# Patient Record
Sex: MALE | Race: BLACK OR AFRICAN AMERICAN | Employment: OTHER | URBAN - METROPOLITAN AREA
[De-identification: names, ages, dates, MRNs, and addresses within clinical notes are randomized per-mention and may not be internally consistent; named-entity substitution may affect disease eponyms.]

---

## 2019-07-22 ENCOUNTER — HOSPITAL ENCOUNTER (OUTPATIENT)
Dept: LAB | Age: 64
Discharge: HOME | End: 2019-07-22
Payer: MEDICARE

## 2019-07-22 ENCOUNTER — OFFICE VISIT (OUTPATIENT)
Dept: HEMATOLOGY | Age: 64
End: 2019-07-22

## 2019-07-22 VITALS
SYSTOLIC BLOOD PRESSURE: 184 MMHG | HEIGHT: 70 IN | TEMPERATURE: 96.5 F | BODY MASS INDEX: 23.19 KG/M2 | HEART RATE: 52 BPM | DIASTOLIC BLOOD PRESSURE: 97 MMHG | OXYGEN SATURATION: 98 % | WEIGHT: 162 LBS

## 2019-07-22 DIAGNOSIS — Z11.59 ENCOUNTER FOR SCREENING FOR OTHER VIRAL DISEASES: ICD-10-CM

## 2019-07-22 DIAGNOSIS — B18.2 CHRONIC HEPATITIS C WITHOUT HEPATIC COMA (HCC): Primary | ICD-10-CM

## 2019-07-22 PROBLEM — I42.9 CARDIOMYOPATHY (HCC): Status: ACTIVE | Noted: 2019-07-22

## 2019-07-22 PROBLEM — J43.9 EMPHYSEMA LUNG (HCC): Status: ACTIVE | Noted: 2019-07-22

## 2019-07-22 PROBLEM — F14.90 COCAINE USE: Status: ACTIVE | Noted: 2019-07-22

## 2019-07-22 LAB
ALBUMIN SERPL-MCNC: 4 G/DL (ref 3.4–5)
ALBUMIN/GLOB SERPL: 0.9 {RATIO} (ref 0.8–1.7)
ALP SERPL-CCNC: 80 U/L (ref 45–117)
ALT SERPL-CCNC: 51 U/L (ref 16–61)
ANION GAP SERPL CALC-SCNC: 2 MMOL/L (ref 3–18)
AST SERPL-CCNC: 54 U/L (ref 10–38)
BASOPHILS # BLD: 0 K/UL (ref 0–0.1)
BASOPHILS NFR BLD: 1 % (ref 0–2)
BILIRUB DIRECT SERPL-MCNC: 0.2 MG/DL (ref 0–0.2)
BILIRUB SERPL-MCNC: 0.6 MG/DL (ref 0.2–1)
BUN SERPL-MCNC: 12 MG/DL (ref 7–18)
BUN/CREAT SERPL: 11 (ref 12–20)
CALCIUM SERPL-MCNC: 9.6 MG/DL (ref 8.5–10.1)
CHLORIDE SERPL-SCNC: 105 MMOL/L (ref 100–111)
CO2 SERPL-SCNC: 33 MMOL/L (ref 21–32)
CREAT SERPL-MCNC: 1.13 MG/DL (ref 0.6–1.3)
DIFFERENTIAL METHOD BLD: ABNORMAL
EOSINOPHIL # BLD: 0.1 K/UL (ref 0–0.4)
EOSINOPHIL NFR BLD: 2 % (ref 0–5)
ERYTHROCYTE [DISTWIDTH] IN BLOOD BY AUTOMATED COUNT: 13 % (ref 11.6–14.5)
GLOBULIN SER CALC-MCNC: 4.7 G/DL (ref 2–4)
GLUCOSE SERPL-MCNC: 81 MG/DL (ref 74–99)
HCT VFR BLD AUTO: 44.3 % (ref 36–48)
HGB BLD-MCNC: 14.5 G/DL (ref 13–16)
INR PPP: 1 (ref 0.8–1.2)
LYMPHOCYTES # BLD: 1.4 K/UL (ref 0.9–3.6)
LYMPHOCYTES NFR BLD: 48 % (ref 21–52)
MCH RBC QN AUTO: 30.9 PG (ref 24–34)
MCHC RBC AUTO-ENTMCNC: 32.7 G/DL (ref 31–37)
MCV RBC AUTO: 94.3 FL (ref 74–97)
MONOCYTES # BLD: 0.3 K/UL (ref 0.05–1.2)
MONOCYTES NFR BLD: 11 % (ref 3–10)
NEUTS SEG # BLD: 1.1 K/UL (ref 1.8–8)
NEUTS SEG NFR BLD: 38 % (ref 40–73)
PLATELET # BLD AUTO: 144 K/UL (ref 135–420)
PMV BLD AUTO: 13 FL (ref 9.2–11.8)
POTASSIUM SERPL-SCNC: 4.4 MMOL/L (ref 3.5–5.5)
PROT SERPL-MCNC: 8.7 G/DL (ref 6.4–8.2)
PROTHROMBIN TIME: 12.7 SEC (ref 11.5–15.2)
RBC # BLD AUTO: 4.7 M/UL (ref 4.7–5.5)
SODIUM SERPL-SCNC: 140 MMOL/L (ref 136–145)
WBC # BLD AUTO: 3 K/UL (ref 4.6–13.2)

## 2019-07-22 PROCEDURE — 85610 PROTHROMBIN TIME: CPT

## 2019-07-22 PROCEDURE — 87902 NFCT AGT GNTYP ALYS HEP C: CPT

## 2019-07-22 PROCEDURE — 85025 COMPLETE CBC W/AUTO DIFF WBC: CPT

## 2019-07-22 PROCEDURE — 86706 HEP B SURFACE ANTIBODY: CPT

## 2019-07-22 PROCEDURE — 36415 COLL VENOUS BLD VENIPUNCTURE: CPT

## 2019-07-22 PROCEDURE — 86704 HEP B CORE ANTIBODY TOTAL: CPT

## 2019-07-22 PROCEDURE — 80076 HEPATIC FUNCTION PANEL: CPT

## 2019-07-22 PROCEDURE — 86708 HEPATITIS A ANTIBODY: CPT

## 2019-07-22 PROCEDURE — 80048 BASIC METABOLIC PNL TOTAL CA: CPT

## 2019-07-22 PROCEDURE — 87340 HEPATITIS B SURFACE AG IA: CPT

## 2019-07-22 PROCEDURE — 87521 HEPATITIS C PROBE&RVRS TRNSC: CPT

## 2019-07-22 RX ORDER — ATORVASTATIN CALCIUM 10 MG/1
10 TABLET, FILM COATED ORAL
COMMUNITY
Start: 2019-07-06 | End: 2019-08-05

## 2019-07-22 RX ORDER — ASPIRIN 81 MG/1
81 TABLET ORAL
COMMUNITY
Start: 2019-07-06 | End: 2020-01-21

## 2019-07-22 NOTE — Clinical Note
8/25/19 Patient: Hortencia Laird  
YOB: 1955 Date of Visit: 7/22/2019 Reed Dietz DO 
[address] [address] VIA Facsimile: [phone] Dear Reed Dietz DO, Thank you for referring Mr. Hortencia Laird to [address] for evaluation. My notes for this consultation are attached. If you have questions, please do not hesitate to call me. I look forward to following your patient along with you. Sincerely, Mariano Jung MD

## 2019-07-22 NOTE — PROGRESS NOTES
3340 hospitalsMD Cruz Ely, MD Luetta Griffin, PA-C Ramonia Churn, [facility]     April S Yu, [facility]   HARSHAD HughesP-JENNIFER Drew [facility]       Malcolm [address]allanes[address]    at [address], [address] [place] [address], [facility] 22.    [phone]    FAX: [address] [place] [address], [address]    [phone]    FAX: [phone]       Patient Care Team:  Liza Banks DO as PCP - General (Family Practice)      Problem List  Date Reviewed: 1/21/2019          Codes Class Noted    Chronic hepatitis C without hepatic coma ([facility] ICD-10-CM: B18.2  ICD-9-CM: 070.54  7/22/2019        Cardiomyopathy ([facility] ICD-10-CM: I42.9  ICD-9-CM: 425.4  7/22/2019        Emphysema lung ([facility] ICD-10-CM: J43.9  ICD-9-CM: 492.8  7/22/2019        Cocaine use ICD-10-CM: F14.90  ICD-9-CM: 305.60  7/22/2019              The clinicians listed above have asked me to see Rylan Harvey in consultation regarding chronic HCV and its management. All medical records sent by the referring physicians were reviewed     The patient is a 75year old male who was screened for anti-HCV and tested positive in 2015/2016. Risk factors for acquiring HCV are IV drug use, inhaling cocaine     There was no history of an episode of acute icteric hepatitis at the time of these risk factors. Imaging of the liver was either not performed or the results are not available to me. An assessment of liver fibrosis with biopsy or elastography has not been performed. The patient has never received treatment for chronic HCV. The patient notes fatigue.     The patient has not experienced the following symptoms:  pain in the right side over the liver, yellowing of the eyes or skin, problems concentrating, swelling of the abdomen, swelling of the lower extremities, hematemesis, hematochezia. The patient has moderate limitations in functional activities which can be attributed to other medical problems that are not related to the liver disease. ASSESSMENT AND PLAN:  Chronic HCV   Chronic HCV of unclear severity. Have performed laboratory testing to monitor liver function and degree of liver injury. This included BMP, hepatic panel, CBC with platelet count,   AST is elevated. ALT is normal.  ALP is normal.  Liver function is normal.  The platelet count is normal.      Will perform and/or review results of HCV viral load, HCV genotype to define the specific treatment and duration of treatment that will be required. Will perform serologic and virologic studies to assess for other causes of chronic liver disease. Will perform imaging of the liver with ultrasound. The need to assess liver fibrosis was discussed. Sheer wave elastography can assess liver fibrosis and determine if a patient has advanced fibrosis or cirrhosis without the need for liver biopsy. This will be scheduled. Chronic HCV Treatment  The patient has not been treated for HCV. The patient has HCV genotype 1A. The patient should be treated with Harvoni (sofasbuvir and ledipasvir), Mavyret (glecaprevir and piprentasvir) or Epclusa (sofosbuvir and valpitasvir)   The SVR/cure rate for is over 95%. Screening for Hepatocellular Carcinoma  HCC screening is not necessary if the patient has no evidence of cirrhosis. Treatment of other medical problems in patients with chronic liver disease  There are no contraindications for the patient to take most medications that are necessary for treatment of other medical issues.     Counseling for alcohol in patients with chronic liver disease  The patient was counseled regarding alcohol consumption and the effect of alcohol on chronic liver disease. The patient does not consume any significant amount of alcohol. Drug use  The patient was counseled regarding the risk of overdose and death from using narcotic drugs and the risk of becoming reinfected with HCV once they are cured if they resume narcotic drug use. Vaccinations   Vaccination for viral hepatitis B is recommended since the patient has no serologic evidence of previous exposure or vaccination with immunity. Vaccination for viral hepatitis A is not needed. The patient has serologic evidence of prior exposure or vaccination with immunity. Routine vaccinations against other bacterial and viral agents can be performed as indicated. Annual flu vaccination should be administered if indicated. ALLERGIES  Not on File    MEDICATIONS  Current Outpatient Medications   Medication Sig    aspirin delayed-release 81 mg tablet Take 81 mg by mouth.  atorvastatin (LIPITOR) 10 mg tablet Take 10 mg by mouth. No current facility-administered medications for this visit. SYSTEM REVIEW NOT RELATED TO LIVER DISEASE OR REVIEWED ABOVE:  Constitution systems: Negative for fever, chills, weight gain, weight loss. Eyes: Negative for visual changes. ENT: Negative for sore throat, painful swallowing. Respiratory: Negative for cough, hemoptysis, SOB. Cardiology: Negative for chest pain, palpitations. GI:  Negative for constipation or diarrhea. : Negative for urinary frequency, dysuria, hematuria, nocturia. Skin: Negative for rash. Hematology: Negative for easy bruising, blood clots. Musculo-skelatal: Negative for back pain, muscle pain, weakness. Neurologic: Negative for headaches, dizziness, vertigo, memory problems not related to HE. Psychology: Negative for anxiety, depression. FAMILY HISTORY:  The patient has no knowledge of the father's medical condition. Father is . The mother is .    There is no family history of liver disease. SOCIAL HISTORY:  The patient is single. The patient has 2 children. The patient currently smokes 10 cigarettes daily. The patient  consumes alcohol on social occasions never in excess. The patient has previously consumed alcohol socially never in excess. The patient does not work. The patient is currently receiving disability. PHYSICAL EXAMINATION:  Visit Vitals  BP (!) 184/97   Pulse (!) 52   Temp 96.5 °F (35.8 °C) (Tympanic)   Ht 5' 9.5\" (1.765 m)   Wt 162 lb (73.5 kg)   SpO2 98%   BMI 23.58 kg/m²     General: No acute distress. Eyes: Sclera anicteric. ENT: No oral lesions. Thyroid normal.  Nodes: No adenopathy. Skin: No spider angiomata. No jaundice. No palmar erythema. Respiratory: Lungs clear to auscultation. Cardiovascular: Regular heart rate. No murmurs. No JVD. Abdomen: Soft non-tender. Liver size normal to percussion/palpation. Spleen not palpable. No obvious ascites. Extremities: No edema. No muscle wasting. No gross arthritic changes. Neurologic: Alert and oriented. Cranial nerves grossly intact. No asterixis.     LABORATORY STUDIES:  Liver [place] of [address] Units 7/22/2019   WBC 4.6 - 13.2 K/uL 3.0 (L)   ANC 1.8 - 8.0 K/UL 1.1 (L)   HGB 13.0 - 16.0 g/dL 14.5    - 420 K/uL 144   INR 0.8 - 1.2   1.0   AST 10 - 38 U/L 54 (H)   ALT 16 - 61 U/L 51   Alk Phos 45 - 117 U/L 80   Bili, Total 0.2 - 1.0 MG/DL 0.6   Bili, Direct 0.0 - 0.2 MG/DL 0.2   Albumin 3.4 - 5.0 g/dL 4.0   BUN 7.0 - 18 MG/DL 12   Creat 0.6 - 1.3 MG/DL 1.13   Na 136 - 145 mmol/L 140   K 3.5 - 5.5 mmol/L 4.4   Cl 100 - 111 mmol/L 105   CO2 21 - 32 mmol/L 33 (H)   Glucose 74 - 99 mg/dL 81     SEROLOGIES:  Serologies Latest Ref Rng & Units 7/22/2019   Hep A Ab, Total NEGATIVE   Positive (A)   Hep B Surface Ag <1.00 Index <0.10   Hep B Surface Ag Interp NEG   NEGATIVE   Hep B Core Ab, Total NEGATIVE   NEGATIVE   Hep B Surface Ab >10.0 mIU/mL 3.38 (L)   Hep B Surface Ab Interp POS   NEGATIVE (A)   Hep C Genotype  1a     LIVER HISTOLOGY:  Not available or performed    ENDOSCOPIC PROCEDURES:  Not available or performed    RADIOLOGY:  Not available or performed    OTHER TESTING:  Not available or performed    FOLLOW-UP:  All of the issues listed above in the Assessment and Plan were discussed with the patient. All questions were answered. The patient expressed a clear understanding of the above. [address] [place] [address] in 4 weeks for routine monitoring and to review all data and determine the treatment plan. MD Ronnell Ferreira [address] [place] [address] 22. [phone]  Tate Pierce MD  I have personally interviewed and examined the patient during the encounter. I have explained the key findings related to the liver disease and other pertinent diagnoses as noted above to the patient and answered all questions. I have reviewed and agree with the findings documented above, including all diagnostic interpretations, and plans. I have edited the original note as appropriate for clarity.     MD Ronnell Louise [address] [place] [address] 22.  [phone]  [address] [place] [address]

## 2019-07-23 LAB
HAV AB SER QL IA: POSITIVE
HBV CORE AB SERPL QL IA: NEGATIVE
HBV SURFACE AB SER QL IA: NEGATIVE
HBV SURFACE AB SERPL IA-ACNC: 3.38 MIU/ML
HBV SURFACE AG SER QL: <0.1 INDEX
HBV SURFACE AG SER QL: NEGATIVE
HEP BS AB COMMENT,HBSAC: ABNORMAL

## 2019-07-26 LAB
HCV RNA SERPL NAA+PROBE-LOG IU: 4.94 HCV LOG 10IU/ML
HCV RNA SERPL PROBE AMP-ACNC: ABNORMAL HCVIU/ML
HCV RNA SERPL QL NAA+PROBE: POSITIVE

## 2019-07-28 LAB
HCV GENTYP SERPL NAA+PROBE: NORMAL
PLEASE NOTE, 550474: NORMAL

## 2019-08-15 DIAGNOSIS — B18.2 CHRONIC HEPATITIS C WITHOUT HEPATIC COMA (HCC): Primary | ICD-10-CM

## 2019-09-25 ENCOUNTER — HOSPITAL ENCOUNTER (OUTPATIENT)
Dept: GENERAL RADIOLOGY | Age: 64
Discharge: HOME | End: 2019-09-25
Attending: FAMILY MEDICINE
Payer: MEDICARE

## 2019-09-25 DIAGNOSIS — R13.10 DYSPHAGIA: ICD-10-CM

## 2019-09-25 PROCEDURE — 74220 X-RAY XM ESOPHAGUS 1CNTRST: CPT

## 2019-09-25 PROCEDURE — 74011000255 HC RX REV CODE- 255

## 2019-09-25 PROCEDURE — 74011000250 HC RX REV CODE- 250

## 2019-09-25 RX ADMIN — ANTACID/ANTIFLATULENT 4 G: 380; 550; 10; 10 GRANULE, EFFERVESCENT ORAL at 09:19

## 2019-09-25 RX ADMIN — BARIUM SULFATE 135 ML: 980 POWDER, FOR SUSPENSION ORAL at 09:18

## 2019-09-25 RX ADMIN — BARIUM SULFATE 176 G: 960 POWDER, FOR SUSPENSION ORAL at 09:18

## 2019-10-08 ENCOUNTER — HOSPITAL ENCOUNTER (OUTPATIENT)
Dept: PHYSICAL THERAPY | Age: 64
Discharge: HOME | End: 2019-10-08
Payer: MEDICARE

## 2019-10-08 PROCEDURE — 92610 EVALUATE SWALLOWING FUNCTION: CPT

## 2019-10-08 PROCEDURE — 92526 ORAL FUNCTION THERAPY: CPT

## 2019-10-08 NOTE — PROGRESS NOTES
[facility]  [address] [facility] [facility] [address] Serenity Miranda, [address] [address] [facility]  Ph [phone]  Fx [phone]      Plan of Care/ Statement of Necessity for Speech Therapy Services    Patient name: Carmen Brennan Start of Care: 10/8/2019   Referral source: London Timmons DO : 1955    Medical Diagnosis: Dysphagia [R13.10]   Onset Date:    Treatment Diagnosis: Dysphagia [R13.10]   Prior Hospitalization: see medical history Provider#: [id number]   Medications: Verified on Patient summary List    Comorbidities: Arthritis, HTN, Wears glasses, CVA, Hepatitis A, Former drug use, Tobacco use   Prior Level of Function: Initially, swallow difficulty began in early  following cessation of illicit drug use. Difficulty with swallowing worsened following left CVA suffered 3 months ago. The Plan of Care and following information is based on the information from the initial evaluation. Assessment/ key information:   Pt is a 59-year-old male who was referred for an outpatient Dysphagia evaluation due to difficulty swallowing. Initially, swallow difficulty began in early  following cessation of illicit drug use. Difficulty with swallowing worsened following left CVA suffered 3 months ago. He reported globus sensation with liquids and solids and excessive coughing during meal times. Additionally, Pt takes medication without liquid, as he shared taking with liquid leads to coughing spells. Recent barium esophagram performed on 2019 revealed silent aspiration and diffuse diminished esophageal peristalsis with tertiary contractions. Clinical Swallow Examination performed this date. Pt passed an oral mechanism/cranial nerve examination for CN V, IX, X, XI, XII. Mild right facial weakness and nasolabial fold asymmetry. Do anterior loss observed during CSE and none reported by Pt. Speech intelligibility was within functional limits, although mild articulatory imprecision noted during connected speech.  No evidence of Aphasia or Apraxia. Pt is partially edentulous and does not wear dentures. Poor dental hygiene noted and small, brown spots observed on soft/hard palate. Pt shared that he will be visiting a dentist soon. Uvula was symmetrical at rest. Tongue size/shape was [facility] with no evidence of abnormal fasciculations present. Slightly reduced facial tone on lower right quadrant and decreased nasolabial fold symmetry. Otherwise, examination of facial musculature was [facility]. Pt administered the EAT-10. 40% of scores were greater than 2, indicating difficulty swallowing efficiently and safely. Pt received a passing score on the [facility] Protocol, as there were no overt s/sx of aspiration noted during consecutive sips of water, although globus sensation was noted with thin liquid, as well as multiple swallows post initial swallow of bolus. It is recommended that Pt participate in a modified barium swallow study to full assess structure and physiology of the swallowing mechanism to determine best course of action. Pt educated on compensatory strategy to utilize in the home to better manage symptoms until date of MBS (alternate liquids and solids, slow rate of consumption). Problem List:      []aphasic  []dysarthric  [x]dysphagic       []alexic  []agraphic  []dysphonia       []dysfluency   []Cognitive-Linguistic Disorder       []other   Treatment Plan may include any combination of the following: Dysphagia Treatment and Patient Education      Patient / Family readiness to learn indicated by: asking questions and interest    Persons(s) to be included in education:   patient (P)    Barriers to Learning/Limitations: None  Measures Taken If Barriers to Learning Indicated: N/A    Patient Goal (s): I want to correct whatever's wrong.     Patient Self Reported Health Status: fair    Rehabilitation Potential: good    Short Term Goals:  To be accomplished in 8 weeks  1) Pt will participate in a modified barium swallow study to formally assess structure and physiology of swallow function to determine best treatment plan and diet recommendations. *Goals to be updated upon completion of test    Long Term Goals: To be accomplished in 10 weeks   1) Pt will utilize compensatory strategies with optimum safety and efficiency of swallowing function on P.O. intake without overt signs and symptoms of aspiration for the highest appropriate diet level. Frequency / Duration: Patient to be seen 1 times per week for 8 weeks:  Certification Period: 10/8/19 to 11/7/19   Patient/ Caregiver education and instruction: Diagnosis, prognosis, compensatory strategies, MBSS education    MANI Quinonez 10/8/2019 12:13 PM  ________________________________________________________________________    I certify that the above Therapy Services are being furnished while the patient is under my care. I agree with the treatment plan and certify that this therapy is necessary.     Physician's Signature:____________Date:_________TIME:________    [employer], Date and Time must be completed for valid certification **    Please sign and return to [facility]  [address], [address] [facility]  Ph [phone]  Fx [phone]       Thank you

## 2019-10-08 NOTE — PROGRESS NOTES
ST DAILY TREATMENT NOTE/ SWALLOW EVAL    Patient Name: Ramesh Silverio  Date:10/8/2019  : 1955  [x]  Patient  Verified  Payor: Duran Souaz / Plan: [address] [place] [address] HMO / Product Type: Managed Care Medicare /   In time:11;30  Out time:12;15  Total Treatment Time (min): 45  Visit #: 1 of 8    SUBJECTIVE  Pain Level (0-10 scale): 0  Any medication changes, allergies to medications, adverse drug reactions, diagnosis change, or new procedure performed?: [x] No    [] Yes (see summary sheet for update)  Subjective functional status/changes:   [] No changes reported  I want to correct whatever's wrong.   Date of Onset:   Social History: Not working; Likes to listen to music, lives with roommate   Prior Functional level: Initially, swallow difficulty began in early  following cessation of illicit drug use. Difficulty with swallowing worsened following left CVA suffered 3 months ago. Radiology: Recent barium esophagram performed on 2019 revealed silent aspiration and diffuse diminished esophageal peristalsis with tertiary contractions. Current diet: Regular with thin liquids  positionin degrees upright  Mental Status:  [x]alert []lethargic []confused  Orientation: [x]person [x]place [x]time [x]situation  AC Directions: []1-step []2-step []3-step [x]complex  Motivation: [x]excellent []good  []fair  []poor   Barriers to learning: [x]none []aphasia []? cognition []lethargy/motivation [][place]   []?vision []language []Fatigue/pain []psych factors compensate with:   Dentition: []normal []abnormal [x]edentulous []dentures   Respiratory Status: [x]WFL []SOB  []O2 L/min:  []NC []Mask               []Trach Tube:                     []Excess secretions  Lips:  []Symmetrical [x]asymmetrical  Retraction []WFL  [x]?min []?mod []?max  Protrusion [x]WFL  []?min []?mod []?max  Strength [x]WFL  []?min []?mod []?max  Puff  []WFL  []?min []?mod []?max  Tongue:  [x]Symmetrical []asymmetrical  Protrusion [x]WFL  []?min []?mod []?max  Elevation [x]WFL  []?min []?mod []?max  Depression [x]WFL  []?min []?mod []?max  Lateralization [x]WFL  []?min []?mod []?max  Strength [x]WFL  []?min []?mod []?max  Velum:  [x]Symmetrical []asymmetrical  Gag Reflex:  []Present []absent [x]DNT  Sensation:  [x]Intact []Diminished  Specify: CN test confirmed sensation WFL  Voice:  [x]Normal []Hoarse []harsh  []Breathy []Hypernasal []hyponasal  []Gurgly Other:   Swallow:  []Volitional []absent  [x]Reflexive []absent  Cough   Strength : [x]WNL []Diminished  [x]Volitional []absent  [x]Reflexive []absent  OBJECTIVE    OP SWALLOW EVALUATION    Observation of Swallow:  Thin Nectar Honey Puree  applesauce Solids  Mechanical soft fruit and grain bar/regular peanut butter cracker nabs Other  Mixed consistency canned fruit cocktail   Oral Phase         Anterior loss of bolus  (decreased labial seal [])         Decreased bolus formation  (?lingual ROM/Coord[])         Increased mastication         Increased oral transit time  (?A-P bolus transit[])         Abnormal chewing         Tongue pumping/tremors         Pocketing  (?labial/buccal tension/lingual control)         Other         Oral Pharyngeal Phase         Delayed swallow initiation         Absent swallow         Stasis on lingual surface  (?lingual movement)         Adherence to hard palate   (? lingual elevation)         Pharyngeal Phase         Multiple swallows  (residue in pharynx [x]) X        Coughing post swallow         Throat clear post swallow         Wet vocal quality  (residue on vocal cords [])         Reduced laryngeal elevation         Nasal Regurgitation  (? velopharyngeal closure)         Other           [] No symptoms of dysphagia evidenced  [] Symptoms of dysphagia observed  [] Patient at risk for aspiration  [x] Other: Instrumental assessment warranted    Recommendations:  Diet:  [] NPO    [] Pureed [] Ground [x] MechSoft [x] Regular  Liquids: [x] Water  [] Regular [] Thickened   [] Marty  [] Honeythick  [] Pudding  Presentation: [x] Cup    [] Spoon [] Straw [] Alternate liquids and solids  Monitor: [x] Sitting up at 90 deg [] Reclined to:  [] Head turned to:    [] Chin tuck  [] Head tilt to:      [] Seated upright post meals (min):  Document: [] Coughing [] Temperatures [] Lung Sounds    Videoflouroscopy: [x] Yes [] No  Dysphagia Treatment: [x] Yes [] No Sessions per week:1  Other:    Remediation Techniques:  C = Compensatory techniques to use during meal      F = Facilitation/treatment techniques by SLP    [] Supraglottic Swallow (c,f)    [] Oral motor exercises (f)  [] Super-supraglottic swallow (c,f)   [] Labial closure  [] Compensations for pocketing (c)   [] Lingual elevation  [] Sweep mouth with tongue    [] Lingual lateralization  [] Sweep mouth with finger    [] Lingual anterior-posterior  [] External pressure to check   [] Lingual base of tongue  [] Rinse mouth/expel after meal   [] Vocal Fold Exercises (f)  [x] Alternate liquid swallows every 1-2 bites (c)  [] Falsetto/laryngeal elevation exercises (f)  [] Discourage liquid wash between bites (c)  [] Thermal application (c,f)  [x] Multiple swallows     [] Sour bolus (f)  [] Patient needs cues     [] Cold bolus (f)  [] Patient does not need cues   [] Pharyngeal exercise (f)  [] Mendelsohn Maneuver (c,f)    [] Breath hold  [] Encourage/stimulate lip closure (c)   [] Effortful Swallow (c,f)  [] Empty mouth before next bite (c)   [] Tongue base retraction  [] Cue patient to slow down (c)    [] Tongue hold  [] Encourage coughing (c)    [] Laryngeal closure  []Chin tuck (c)  []Head turn  (c)  []Head turn , chin tuck (c)    Patient/Caregiver instruction/education: [x] Review HEP    [] Progressed/Changed    HEP/Handouts given: Implement compensatory strategies    Pain Level (0-10 scale) post treatment: 0    ASSESSMENT  [x]  See Plan of Care     PLAN  []  Upgrade activities as tolerated     [x]  Continue plan of care  []  Discharge due to:__  [] Other:__    MANI Gaytan 10/8/2019  12:37 PM

## 2019-10-09 ENCOUNTER — HOSPITAL ENCOUNTER (OUTPATIENT)
Dept: ULTRASOUND IMAGING | Age: 64
Discharge: HOME | End: 2019-10-09
Attending: INTERNAL MEDICINE
Payer: MEDICARE

## 2019-10-09 DIAGNOSIS — B18.2 CHRONIC HEPATITIS C WITHOUT HEPATIC COMA (HCC): ICD-10-CM

## 2019-10-09 PROCEDURE — 76981 USE PARENCHYMA: CPT

## 2019-10-24 ENCOUNTER — HOSPITAL ENCOUNTER (OUTPATIENT)
Dept: GENERAL RADIOLOGY | Age: 64
Discharge: ANOTHER HOSPITAL | End: 2019-10-24
Attending: FAMILY MEDICINE
Payer: MEDICARE

## 2019-10-24 ENCOUNTER — HOSPITAL ENCOUNTER (OUTPATIENT)
Dept: PHYSICAL THERAPY | Age: 64
Discharge: HOME | End: 2019-10-24
Payer: MEDICARE

## 2019-10-24 DIAGNOSIS — R13.10 PROBLEMS WITH SWALLOWING AND MASTICATION: ICD-10-CM

## 2019-10-24 PROCEDURE — 74011000255 HC RX REV CODE- 255

## 2019-10-24 PROCEDURE — 74230 X-RAY XM SWLNG FUNCJ C+: CPT

## 2019-10-24 PROCEDURE — 92611 MOTION FLUOROSCOPY/SWALLOW: CPT

## 2019-10-24 RX ADMIN — BARIUM SULFATE 35 G: 960 POWDER, FOR SUSPENSION ORAL at 09:27

## 2019-10-24 RX ADMIN — BARIUM SULFATE 700 MG: 700 TABLET ORAL at 09:27

## 2019-10-24 RX ADMIN — BARIUM SULFATE 10 ML: 400 PASTE ORAL at 09:27

## 2019-10-24 NOTE — PROGRESS NOTES
[facility] [place] [address]  Ph: [phone]    Fax: [phone]    Outpatient Modified Barium Swallow Evaluation    Patient: Dimitri Singh (87 y.o. male)  Date: 10/24/2019  Primary Diagnosis: No admission diagnoses are documented for this encounter. Precautions: Aspiration    Radiologist: Dossie Goodpasture    History: Swallow difficulty began in early 2019 following cessation of illicit drug use. Difficulty with swallowing worsened following left CVA suffered 3 months ago. Patient reports having many recent dental extractions, reports mouth is currently painful. Videofluoroscopy Results/Recommendations:  Modified Barium Swallow completed with 0 aspiration evident across all study trials, to include: thin liquid +/- straw; pudding; cracker; and 13 mm barium pill with thin liquid wash. Pt demo: significantly delayed mastication of solids due to limited dentition with premature spillage to the level of the vallcula. x1 occurrence of flash penetration with thin liquid via straw. (+) Swallow reflex; and (+) hyolaryngeal excursion. Pt presents with mild oral dysphagia due to poor dentition, as evidenced above. At this time, safe for mechanical soft solid, thin liquid diet. SLP utilized video of study for visual feedback, education regarding safe swallowing guidelines, and recommendations for patient; verbalized comprehension. Video Flouroscopic Procedures  [x] Lateral View   [] A-P View [] Scanned to level of Sternum    [x] Seated at 90 deg.   [] Other:    Presentation:    [x] Spoon   [x] Cup   [x] Straw   [] Syringe   [x] Consecutive Swallows  [] Other:    Consistencies:   [x] Ba+ liquid   [] Ba+ liquid (nectar)   [] Ba+ liquid (honey)   [x] Ba+ puree [x] Ba+ cookie [x] 13 mm Ba pill with thin Ba wash    Testing Discontinued:   [] Due to:    Treatment Techniques Attempted  [] Head Turn: [] Right [] Left     [] Head Tilt: [] Right [] Left     [] Chin Down:  [x] Small Sips/bites:  [] Effortful swallow:  [] Double swallow:  [] Other:    Results  Dysphagia Present:     [x] Yes  [] No    Ratings of Dysphagia:     [x] Mild  [] Moderate  [] Severe    Stages of Breakdown:   [x] Oral  [] Pharyngeal  [] Esophageal    Aspiration:   [] Yes    [x] No  [] At Risk     Cough: [] Yes      [] No     Penetration:   [x] Yes    [] No     Cough: [] Yes      [x] No   [x] Flash/trace   [] Mod   [] To Chords          Consistency Aspirated:   Consistency Penetrated:   [] Thin Liquid     [x] Thin Liquid   [] Nectar-thick Liquid    [] Nectar-thick Liquid   [] Honey-thick Liquid    [] Honey-thick Liquid   [] Puree     [] Puree  [] Solid     [] Solid  [] 13 mm Ba pill with thin   [] 13 mm Ba pill with thin     Motility Problems with:  [] Lip Closure:    [x] Mastication: delayed  [] Bolus Formation/control:   [] A-P Transport:  [] Tongue Base Retraction:  [] Swallow Response (delayed):  [] Velopharyngeal Closure:  [] Pharyngeal Aspirations:  [] Laryngeal Elevation/adduction:  [] Epiglottic Inversion:  [] Pharyngeal motility/sensation:  [] Cricopharyngeal Relaxation:  [] Esophageal Peristalsis:  [] Other:    Timing of Aspiration/Penetration:  [] Before Swallow:  [x] During Swallow:   [] After Swallow:    Transit Time Delay:  [x] >10 Second  Oral  [] >1 Second Pharyngeal  [] >20 Second Esophageal     Residuals:  [] Vallecula:    [] Mild  [] Mod  [] Severe  [] Pyriform Sinus:   [] Mild  [] Mod  [] Severe  [] Posterior Pharyngeal Wall:  [] Mild  [] Mod  [] Severe    The severity rating is based on the following outcomes:    National Outcomes Measures (NOMS) - CHRISTIAN Noms Swallow Level 5  8-point Penetration-Aspiration Scale - Score 2  Professional Judgement    Sindi Amor M.S., CCC-SLP  Speech-Language Pathologist    [facility]

## 2019-11-05 ENCOUNTER — OFFICE VISIT (OUTPATIENT)
Dept: HEMATOLOGY | Age: 64
End: 2019-11-05

## 2019-11-05 ENCOUNTER — HOSPITAL ENCOUNTER (OUTPATIENT)
Dept: LAB | Age: 64
Discharge: HOME | End: 2019-11-05
Payer: MEDICARE

## 2019-11-05 VITALS
HEIGHT: 70 IN | DIASTOLIC BLOOD PRESSURE: 115 MMHG | SYSTOLIC BLOOD PRESSURE: 203 MMHG | WEIGHT: 178 LBS | TEMPERATURE: 96.7 F | BODY MASS INDEX: 25.48 KG/M2 | HEART RATE: 62 BPM | OXYGEN SATURATION: 98 %

## 2019-11-05 DIAGNOSIS — B18.2 CHRONIC HEPATITIS C WITHOUT HEPATIC COMA (HCC): ICD-10-CM

## 2019-11-05 DIAGNOSIS — B18.2 CHRONIC HEPATITIS C WITHOUT HEPATIC COMA (HCC): Primary | ICD-10-CM

## 2019-11-05 PROBLEM — I10 HTN (HYPERTENSION): Status: ACTIVE | Noted: 2019-11-05

## 2019-11-05 PROBLEM — F32.A DEPRESSION: Status: ACTIVE | Noted: 2019-11-05

## 2019-11-05 PROBLEM — E78.00 HYPERCHOLESTEROLEMIA: Status: ACTIVE | Noted: 2019-11-05

## 2019-11-05 LAB
ALBUMIN SERPL-MCNC: 3.6 G/DL (ref 3.4–5)
ALBUMIN/GLOB SERPL: 0.8 {RATIO} (ref 0.8–1.7)
ALP SERPL-CCNC: 75 U/L (ref 45–117)
ALT SERPL-CCNC: 49 U/L (ref 16–61)
ANION GAP SERPL CALC-SCNC: 6 MMOL/L (ref 3–18)
AST SERPL-CCNC: 51 U/L (ref 10–38)
BASOPHILS # BLD: 0 K/UL (ref 0–0.1)
BASOPHILS NFR BLD: 0 % (ref 0–2)
BILIRUB DIRECT SERPL-MCNC: 0.2 MG/DL (ref 0–0.2)
BILIRUB SERPL-MCNC: 0.4 MG/DL (ref 0.2–1)
BUN SERPL-MCNC: 11 MG/DL (ref 7–18)
BUN/CREAT SERPL: 12 (ref 12–20)
CALCIUM SERPL-MCNC: 9 MG/DL (ref 8.5–10.1)
CHLORIDE SERPL-SCNC: 106 MMOL/L (ref 100–111)
CO2 SERPL-SCNC: 28 MMOL/L (ref 21–32)
CREAT SERPL-MCNC: 0.9 MG/DL (ref 0.6–1.3)
DIFFERENTIAL METHOD BLD: ABNORMAL
EOSINOPHIL # BLD: 0.1 K/UL (ref 0–0.4)
EOSINOPHIL NFR BLD: 3 % (ref 0–5)
ERYTHROCYTE [DISTWIDTH] IN BLOOD BY AUTOMATED COUNT: 13.5 % (ref 11.6–14.5)
GLOBULIN SER CALC-MCNC: 4.7 G/DL (ref 2–4)
GLUCOSE SERPL-MCNC: 82 MG/DL (ref 74–99)
HCT VFR BLD AUTO: 44.1 % (ref 36–48)
HGB BLD-MCNC: 14.3 G/DL (ref 13–16)
LYMPHOCYTES # BLD: 1.7 K/UL (ref 0.9–3.6)
LYMPHOCYTES NFR BLD: 49 % (ref 21–52)
MCH RBC QN AUTO: 31 PG (ref 24–34)
MCHC RBC AUTO-ENTMCNC: 32.4 G/DL (ref 31–37)
MCV RBC AUTO: 95.5 FL (ref 74–97)
MONOCYTES # BLD: 0.5 K/UL (ref 0.05–1.2)
MONOCYTES NFR BLD: 13 % (ref 3–10)
NEUTS SEG # BLD: 1.2 K/UL (ref 1.8–8)
NEUTS SEG NFR BLD: 35 % (ref 40–73)
PLATELET # BLD AUTO: 185 K/UL (ref 135–420)
PMV BLD AUTO: 12.5 FL (ref 9.2–11.8)
POTASSIUM SERPL-SCNC: 3.6 MMOL/L (ref 3.5–5.5)
PROT SERPL-MCNC: 8.3 G/DL (ref 6.4–8.2)
RBC # BLD AUTO: 4.62 M/UL (ref 4.7–5.5)
SODIUM SERPL-SCNC: 140 MMOL/L (ref 136–145)
WBC # BLD AUTO: 3.5 K/UL (ref 4.6–13.2)

## 2019-11-05 PROCEDURE — 36415 COLL VENOUS BLD VENIPUNCTURE: CPT

## 2019-11-05 PROCEDURE — 80048 BASIC METABOLIC PNL TOTAL CA: CPT

## 2019-11-05 PROCEDURE — 87522 HEPATITIS C REVRS TRNSCRPJ: CPT

## 2019-11-05 PROCEDURE — 85025 COMPLETE CBC W/AUTO DIFF WBC: CPT

## 2019-11-05 PROCEDURE — 80076 HEPATIC FUNCTION PANEL: CPT

## 2019-11-05 RX ORDER — CEPHALEXIN 500 MG/1
CAPSULE ORAL
Refills: 0 | COMMUNITY
Start: 2019-10-22 | End: 2020-01-21

## 2019-11-05 RX ORDER — SERTRALINE HYDROCHLORIDE 50 MG/1
50 TABLET, FILM COATED ORAL
COMMUNITY
Start: 2018-03-16

## 2019-11-05 RX ORDER — VELPATASVIR AND SOFOSBUVIR 100; 400 MG/1; MG/1
1 TABLET, FILM COATED ORAL DAILY
Qty: 28 TAB | Refills: 2 | Status: SHIPPED | OUTPATIENT
Start: 2019-11-05 | End: 2020-05-13 | Stop reason: ALTCHOICE

## 2019-11-05 RX ORDER — TRAZODONE HYDROCHLORIDE 100 MG/1
100 TABLET ORAL
COMMUNITY
Start: 2018-03-16 | End: 2020-01-21

## 2019-11-05 RX ORDER — ATORVASTATIN CALCIUM 10 MG/1
10 TABLET, FILM COATED ORAL
COMMUNITY
Start: 2019-07-06 | End: 2020-01-21

## 2019-11-05 RX ORDER — HYDROCHLOROTHIAZIDE 25 MG/1
25 TABLET ORAL
COMMUNITY
Start: 2018-03-16

## 2019-11-05 RX ORDER — TRAMADOL HYDROCHLORIDE 50 MG/1
50 TABLET ORAL
COMMUNITY
Start: 2012-01-16 | End: 2020-01-21

## 2019-11-05 RX ORDER — LISINOPRIL 20 MG/1
20 TABLET ORAL
COMMUNITY
Start: 2018-03-16

## 2019-11-05 RX ORDER — CARVEDILOL 12.5 MG/1
12.5 TABLET ORAL
COMMUNITY

## 2019-11-05 NOTE — PROGRESS NOTES
[address] [facility], Julio SANFORD MD Kenn Rolls, PA-C Lynnda Foley, [facility]     Nancy Nicholas [facility]   IRMA Sandhu-JENNIFER Willis [facility]       [facility]    at [address], [address] [place] [address], [facility] 22.    [phone]    FAX: [address] [place] [address]    at [address], [address], [address]    [phone]    FAX: [phone]       Patient Care Team:  Jaciel Sihelds DO as PCP - General (Family Practice)      Problem List  Date Reviewed: 8/25/2019          Codes Class Noted    Chronic hepatitis C without hepatic [facility] ICD-10-CM: B18.2  ICD-9-CM: 070.54  7/22/2019        Cardiomyopathy ([facility] ICD-10-CM: I42.9  ICD-9-CM: 425.4  7/22/2019        Emphysema lung ([facility] ICD-10-CM: J43.9  ICD-9-CM: 492.8  7/22/2019        Cocaine use ICD-10-CM: F14.90  ICD-9-CM: 305.60  7/22/2019                Dimitri Singh returns to the [place] [address] today for education and management of chronic hepatitis C. The active problem list, all pertinent past medical history, medications, liver histology, endoscopic studies, radiologic findings and laboratory findings related to the liver disorder were reviewed with the patient. The patient is a 75year old male who was screened for anti-HCV and tested positive in 2015/2016. Risk factors for acquiring HCV are IV drug use, inhaling cocaine     There was no history of an episode of acute icteric hepatitis at the time of these risk factors. Imaging of the liver was performed with ultrasound in 10/2019. Nonspecific heterogeneous hepatic echotexture suggesting intrinsic liver disease. No evidence for discrete hepatic lesion or portal hypertension.       An assessment of liver fibrosis with shear wave elastography was performed in 10/2019. This suggests a Metavir fibrosis score of F1. The patient has never received treatment for chronic HCV. The patient notes fatigue. The patient has not experienced the following symptoms:  pain in the right side over the liver, yellowing of the eyes or skin, problems concentrating, swelling of the abdomen, swelling of the lower extremities, hematemesis, hematochezia. The patient has moderate limitations in functional activities which can be attributed to other medical problems that are not related to the liver disease. Since the last office appointment the patient has:  Had no changes in the liver disease. Had both an ultrasound and shear wave elastography completed. Ordered 12 weeks of epclusa today. ASSESSMENT AND PLAN:  Chronic HCV   Chronic HCV with mild hepatic fibrosis. The most recent laboratory studies indicate that the AST is elevated, the ALT is normal, alkaline phosphatase is normal, tests of hepatic synthetic and metabolic function are normal, and the platelet count is normal.      Will perform laboratory testing to monitor liver function and degree of liver injury. This will include hepatic panel, a CBC w/ diff, a BMP, and HCV RNA. Serologic evaluation was negative for all causes of chronic liver disease. Chronic HCV Treatment  The patient has not been treated for HCV. The patient has HCV genotype 1A. One treatment is Epclusa, a combination drug utilizing 400 mg of sofosbuvir (an NS5B polymerase inhibitor)+ 100 mg of velpatasvir (an NS5A inhibitor). This is approved for all genotypes and is approved patients with compensated cirrhosis. The regime is for 12 weeks of treatment. This treatment regime was explained in detail, including dosing and side effects. Educational material was provided. Nataly Thayer was ordered today through Malcolm Sanchez in [place]. The patient was instructed not to start any PPI while on treatment. The patient was instructed not to take any antacids within 4 hours of taking the Epclusa. The patient verbalized understanding of these instructions. Screening for Hepatocellular Carcinoma  HCC screening was recently performed with ultrasound. Ultrasound was unremarkable. Treatment of other medical problems in patients with chronic liver disease  There are no contraindications for the patient to take most medications that are necessary for treatment of other medical issues. Counseling for alcohol in patients with chronic liver disease  The patient was counseled regarding alcohol consumption and the effect of alcohol on chronic liver disease. The patient does not consume any significant amount of alcohol. Drug use  The patient was counseled regarding the risk of overdose and death from using narcotic drugs and the risk of becoming reinfected with HCV once they are cured if they resume narcotic drug use. Vaccinations   Vaccination for viral hepatitis B is recommended since the patient has no serologic evidence of previous exposure or vaccination with immunity. Vaccination for viral hepatitis A is not needed. The patient has serologic evidence of prior exposure or vaccination with immunity. Routine vaccinations against other bacterial and viral agents can be performed as indicated. Annual flu vaccination should be administered if indicated. ALLERGIES  Not on File    MEDICATIONS  Current Outpatient Medications   Medication Sig    atorvastatin (LIPITOR) 10 mg tablet Take 10 mg by mouth.  carvedilol (COREG) 12.5 mg tablet Take 12.5 mg by mouth.  hydroCHLOROthiazide (HYDRODIURIL) 25 mg tablet Take 25 mg by mouth.  lisinopril (PRINIVIL, ZESTRIL) 20 mg tablet Take 20 mg by mouth.  sertraline (ZOLOFT) 50 mg tablet Take 50 mg by mouth.     cephALEXin (KEFLEX) 500 mg capsule take 1 capsule by mouth every 6 hours until finished    traZODone (DESYREL) 100 mg tablet Take 100 mg by mouth.  traMADol (ULTRAM) 50 mg tablet Take 50 mg by mouth.  sofosbuvir-velpatasvir (EPCLUSA) 400-100 mg tablet Take 1 Tab by mouth daily. Indications: Chronic Infection of Genotype 1 Hepatitis C Virus    aspirin delayed-release 81 mg tablet Take 81 mg by mouth. No current facility-administered medications for this visit. SYSTEM REVIEW NOT RELATED TO LIVER DISEASE OR REVIEWED ABOVE:  Constitution systems: Negative for fever, chills, weight gain, weight loss. Eyes: Negative for visual changes. ENT: Negative for sore throat, painful swallowing. Respiratory: Negative for cough, hemoptysis, SOB. Cardiology: Negative for chest pain, palpitations. GI:  Negative for constipation or diarrhea. : Negative for urinary frequency, dysuria, hematuria, nocturia. Skin: Negative for rash. Hematology: Negative for easy bruising, blood clots. Musculo-skelatal: Negative for back pain, muscle pain, weakness. Neurologic: Negative for headaches, dizziness, vertigo, memory problems not related to HE. Psychology: Negative for anxiety, depression. FAMILY HISTORY:  The patient has no knowledge of the father's medical condition. Father is . The mother is . There is no family history of liver disease. SOCIAL HISTORY:  The patient is single. The patient has 2 children. The patient currently smokes 10 cigarettes daily. The patient  consumes alcohol on social occasions never in excess. The patient has previously consumed alcohol socially never in excess. The patient does not work. The patient is currently receiving disability. PHYSICAL EXAMINATION:  Visit Vitals  BP (!) 203/115   Pulse 62   Temp 96.7 °F (35.9 °C) (Tympanic)   Ht 5' 9.5\" (1.765 m)   Wt 178 lb (80.7 kg)   SpO2 98%   BMI 25.91 kg/m²     General: No acute distress. Eyes: Sclera anicteric. ENT: No oral lesions.   Thyroid normal.  Nodes: No adenopathy. Skin: No spider angiomata. No jaundice. No palmar erythema. Respiratory: Lungs clear to auscultation. Cardiovascular: Regular heart rate. No murmurs. No JVD. Abdomen: Soft non-tender. Liver size normal to percussion/palpation. Spleen not palpable. No obvious ascites. Extremities: No edema. No muscle wasting. No gross arthritic changes. Neurologic: Alert and oriented. Cranial nerves grossly intact. No asterixis. LABORATORY STUDIES:  [facility] [place] of [address] Units 7/22/2019   WBC 4.6 - 13.2 K/uL 3.0 (L)   ANC 1.8 - 8.0 K/UL 1.1 (L)   HGB 13.0 - 16.0 g/dL 14.5    - 420 K/uL 144   INR 0.8 - 1.2   1.0   AST 10 - 38 U/L 54 (H)   ALT 16 - 61 U/L 51   Alk Phos 45 - 117 U/L 80   Bili, Total 0.2 - 1.0 MG/DL 0.6   Bili, Direct 0.0 - 0.2 MG/DL 0.2   Albumin 3.4 - 5.0 g/dL 4.0   BUN 7.0 - 18 MG/DL 12   Creat 0.6 - 1.3 MG/DL 1.13   Na 136 - 145 mmol/L 140   K 3.5 - 5.5 mmol/L 4.4   Cl 100 - 111 mmol/L 105   CO2 21 - 32 mmol/L 33 (H)   Glucose 74 - 99 mg/dL 81     SEROLOGIES:  Serologies Latest Ref Rng & Units 7/22/2019   Hep A Ab, Total NEGATIVE   Positive (A)   Hep B Surface Ag <1.00 Index <0.10   Hep B Surface Ag Interp NEG   NEGATIVE   Hep B Core Ab, Total NEGATIVE   NEGATIVE   Hep B Surface Ab >10.0 mIU/mL 3.38 (L)   Hep B Surface Ab Interp POS   NEGATIVE (A)   Hep C Genotype  1a     LIVER HISTOLOGY:  10/2019. Elastography:   Range: 5.79-7.91 kPa  E Mean: 6.89 kPa  E Median: 6.86 kPa  E Std: 0.79 kPa    ENDOSCOPIC PROCEDURES:  Not available or performed    RADIOLOGY:  10/2019. Ultrasound of the liver. Nonspecific heterogeneous hepatic echotexture suggesting intrinsic liver disease. No evidence for discrete hepatic lesion or portal hypertension. OTHER TESTING:  Not available or performed    FOLLOW-UP:  All of the issues listed above in the Assessment and Plan were discussed with the patient. All questions were answered.   The patient expressed a clear understanding of the above. [address] in 4 weeks. The patient should be on HCV treatment by then.       IRMA Madrigal-C  Liver [place] of [address], [address] [place] [address] Jessica Centeno, [address] [place] [address]   [phone]

## 2019-11-07 ENCOUNTER — HOSPITAL ENCOUNTER (OUTPATIENT)
Dept: PHYSICAL THERAPY | Age: 64
End: 2019-11-07

## 2019-11-08 ENCOUNTER — APPOINTMENT (OUTPATIENT)
Dept: PHYSICAL THERAPY | Age: 64
End: 2019-11-08

## 2019-11-08 LAB
HCV RNA SERPL NAA+PROBE-LOG IU: 5.78 HCV LOG 10IU/ML
HCV RNA SERPL PROBE AMP-ACNC: ABNORMAL HCVIU/ML

## 2019-11-11 NOTE — PROGRESS NOTES
[facility]  [address], [address] [facility]  Ph [phone]  Fx [phone]    Speech Therapy Discharge Summary    Patient name: Kobe Jay Start of Care: 10/08/2019   Referral source: Ian Garcia DO : 1955   Medical/Treatment Diagnosis: Dysphagia [R13.10] Onset Date:     Prior Hospitalization: see medical history Provider#: [id number]   Medications: Verified on Patient Summary List    Comorbidities: Arthritis, HTN, Wears glasses, CVA, Hepatitis A, Former drug use, Tobacco use  Prior Level of Function: Initially, swallow difficulty began in early  following cessation of illicit drug use. Difficulty with swallowing worsened following left CVA suffered 3 months ago. Visits from Start of Care: 1    Missed Visits: 2    Reporting Period : 10/8/19 to 10/8/19    Initial Evaluation Findings:  Clinical Swallow Examination performed this date. Pt passed an oral mechanism/cranial nerve examination for CN V, IX, X, XI, XII. Mild right facial weakness and nasolabial fold asymmetry. Do anterior loss observed during CSE and none reported by Pt. Speech intelligibility was within functional limits, although mild articulatory imprecision noted during connected speech. No evidence of Aphasia or Apraxia. Pt is partially edentulous and does not wear dentures. Poor dental hygiene noted and small, brown spots observed on soft/hard palate. Pt shared that he will be visiting a dentist soon. Uvula was symmetrical at rest. Tongue size/shape was [facility] [place] with no evidence of abnormal fasciculations present. Slightly reduced facial tone on lower right quadrant and decreased nasolabial fold symmetry. Otherwise, examination of facial musculature was [place] [place].     Pt administered the EAT-10. 40% of scores were greater than 2, indicating difficulty swallowing efficiently and safely.  Pt received a passing score on the [facility] Protocol, as there were no overt s/sx of aspiration noted during consecutive sips of water, although globus sensation was noted with thin liquid, as well as multiple swallows post initial swallow of bolus. Summary of Care:  Goal: Pt will participate in a modified barium swallow study to formally assess structure and physiology of swallow function to determine best treatment plan and diet recommendations. GOAL MET; At this time, safe for mechanical soft solid, thin liquid diet per performing SLP's recommendation    ASSESSMENT: According to MBS, Pt presented with prolonged mastication, due to recent oral surgery and sparse dentition. Cleared to consume soft solid diet with thin liquids. Pt expressed concerns regarding speech intelligibility. Awaiting new referral to confirm or rule out Dysarthria secondary to CVA. RECOMMENDATIONS:  [x]Discontinue therapy: [x]New referral warranted for continued services      []Patient is non-compliant or has abdicated      []Due to lack of appreciable progress towards set goals    Thank you for this referral!    Rafi Dukes, SLP 11/11/2019 1:32 PM      NOTE TO PHYSICIAN:  PLEASE COMPLETE THE ORDERS BELOW AND   FAX TO [facility]: [phone]  If you are unable to process this request in 24 hours please contact our office: [phone]      I have read the above report and request that my patient continue as recommended. I have read the above report and request that my patient continue therapy with the following changes/special instructions:________________________________________  I have read the above report and request that my patient be discharged from therapy.     [de-identified] Signature:____________Date:_________TIME:________    [employer], Date and Time must be completed for valid certification **

## 2020-01-21 ENCOUNTER — HOSPITAL ENCOUNTER (OUTPATIENT)
Dept: LAB | Age: 65
Discharge: HOME | End: 2020-01-21

## 2020-01-21 ENCOUNTER — OFFICE VISIT (OUTPATIENT)
Dept: HEMATOLOGY | Age: 65
End: 2020-01-21

## 2020-01-21 VITALS
OXYGEN SATURATION: 95 % | HEIGHT: 70 IN | BODY MASS INDEX: 26.2 KG/M2 | WEIGHT: 183 LBS | TEMPERATURE: 96.9 F | DIASTOLIC BLOOD PRESSURE: 85 MMHG | SYSTOLIC BLOOD PRESSURE: 169 MMHG | HEART RATE: 53 BPM

## 2020-01-21 DIAGNOSIS — B18.2 CHRONIC HEPATITIS C WITHOUT HEPATIC COMA (HCC): Primary | ICD-10-CM

## 2020-01-21 LAB — XX-LABCORP SPECIMEN COL,LCBCF: NORMAL

## 2020-01-21 PROCEDURE — 99001 SPECIMEN HANDLING PT-LAB: CPT

## 2020-01-21 NOTE — PROGRESS NOTES
[address] [place] [address], Richelle SANFORD MD Gussie Jetty, PA-C Francoise Milian, [facility]     Nancy MCDONALD [facility]   Pallavi Pineda LLOYD-JENNIFER [facility]       Malcolm Deputado [address]    at [address], [address] [place] [address], [facility].    [phone]    FAX: [address] [place] [address], [address], [address]    [phone]    FAX: [phone]       Patient Care Team:  Lexi Schwab DO as PCP - General (Family Practice)      Problem List  Date Reviewed: 1/21/2020          Codes Class Noted    Hypercholesterolemia ICD-10-CM: E78.00  ICD-9-CM: 272.0  11/5/2019        HTN (hypertension) ICD-10-CM: I10  ICD-9-CM: 401.9  11/5/2019        Depression ICD-10-CM: F32.9  ICD-9-CM: 311  11/5/2019        Chronic hepatitis C without hepatic coma ([facility] ICD-10-CM: B18.2  ICD-9-CM: 070.54  7/22/2019        Cardiomyopathy ([facility] ICD-10-CM: I42.9  ICD-9-CM: 425.4  7/22/2019        [facility] ICD-10-CM: J43.9  ICD-9-CM: 492.8  7/22/2019        Cocaine use ICD-10-CM: F14.90  ICD-9-CM: 305.60  7/22/2019                Harvey Bender returns to the [place] [address] today for education and management of chronic hepatitis C. He began HCV treatment about 9 weeks ago (around 11/16/2019) with a 12 week regime of Epclusa. This is the only time the HCV has ever been treated. The active problem list, all pertinent past medical history, medications, liver histology, endoscopic studies, radiologic findings and laboratory findings related to the liver disorder were reviewed with the patient. The patient is a 75year old male who was screened for anti-HCV and tested positive in 2015/2016.       Risk factors for acquiring HCV are IV drug use, inhaling cocaine. There was no history of an episode of acute icteric hepatitis at the time of these risk factors. Imaging of the liver was performed with ultrasound in 10/2019. Nonspecific heterogeneous hepatic echotexture suggesting intrinsic liver disease. No evidence for discrete hepatic lesion or portal hypertension. An assessment of liver fibrosis with shear wave elastography was performed in 10/2019. This suggests a Metavir fibrosis score of F1. The patient has no complaints which can be attributed to liver disease. The patient has not experienced the following symptoms:  pain in the right side over the liver, yellowing of the eyes or skin, problems concentrating, swelling of the abdomen, swelling of the lower extremities, hematemesis, hematochezia. The patient has moderate limitations in functional activities which can be attributed to other medical problems that are not related to the liver disease. Since the last office appointment the patient has:  Had no changes in the liver disease. Began 12 weeks of Epclusa around 11/16/2019. ASSESSMENT AND PLAN:  Chronic HCV   Chronic HCV with mild hepatic fibrosis. The most recent laboratory studies indicate that the AST is elevated, the ALT is normal, alkaline phosphatase is normal, tests of hepatic synthetic and metabolic function are normal, and the platelet count is normal.      Will perform laboratory testing to monitor liver function and degree of liver injury. This will include hepatic panel, a CBC w/ diff, a BMP, and HCV RNA. Serologic evaluation was negative for all causes of chronic liver disease. Chronic HCV Treatment  The patient has HCV genotype 1A and began a 12 week treatment regime on 11/16/2019 with Epclusa. Swetha Ramos is combination drug utilizing 400 mg of sofosbuvir (an NS5B polymerase inhibitor)+ 100 mg of velpatasvir (an NS5A inhibitor).     The patient denies missing any doses of the Epclusa. He has no treatment related complaints. This is the only time the HCV has ever been treated. The patient was instructed not to start any PPI while on treatment. The patient was instructed not to take any antacids within 4 hours of taking the Epclusa. The patient verbalized understanding of these instructions. Screening for Hepatocellular Carcinoma  HCC screening was recently performed with ultrasound. Ultrasound was unremarkable. Treatment of other medical problems in patients with chronic liver disease  There are no contraindications for the patient to take most medications that are necessary for treatment of other medical issues. Counseling for alcohol in patients with chronic liver disease  The patient was counseled regarding alcohol consumption and the effect of alcohol on chronic liver disease. The patient does not consume any significant amount of alcohol. Drug use  The patient was counseled regarding the risk of overdose and death from using narcotic drugs and the risk of becoming reinfected with HCV once they are cured if they resume narcotic drug use. Vaccinations   Vaccination for viral hepatitis B is recommended since the patient has no serologic evidence of previous exposure or vaccination with immunity. Vaccination for viral hepatitis A is not needed. The patient has serologic evidence of prior exposure or vaccination with immunity. Routine vaccinations against other bacterial and viral agents can be performed as indicated. Annual flu vaccination should be administered if indicated. ALLERGIES  Not on File    MEDICATIONS  Current Outpatient Medications   Medication Sig    carvedilol (COREG) 12.5 mg tablet Take 12.5 mg by mouth.  hydroCHLOROthiazide (HYDRODIURIL) 25 mg tablet Take 25 mg by mouth.  lisinopril (PRINIVIL, ZESTRIL) 20 mg tablet Take 20 mg by mouth.  sertraline (ZOLOFT) 50 mg tablet Take 50 mg by mouth.     sofosbuvir-velpatasvir (EPCLUSA) 400-100 mg tablet Take 1 Tab by mouth daily. Indications: Chronic Infection of Genotype 1 Hepatitis C Virus     No current facility-administered medications for this visit. SYSTEM REVIEW NOT RELATED TO LIVER DISEASE OR REVIEWED ABOVE:  Constitution systems: Negative for fever, chills, weight gain, weight loss. Eyes: Negative for visual changes. ENT: Negative for sore throat, painful swallowing. Respiratory: Negative for cough, hemoptysis, SOB. Cardiology: Negative for chest pain, palpitations. GI:  Negative for constipation or diarrhea. : Negative for urinary frequency, dysuria, hematuria, nocturia. Skin: Negative for rash. Hematology: Negative for easy bruising, blood clots. Musculo-skelatal: Negative for back pain, muscle pain, weakness. Neurologic: Negative for headaches, dizziness, vertigo, memory problems not related to HE. Psychology: Negative for anxiety, depression. FAMILY HISTORY:  The patient has no knowledge of the father's medical condition. Father is . The mother is . There is no family history of liver disease. SOCIAL HISTORY:  The patient is single. The patient has 2 children. The patient currently smokes 10 cigarettes daily. The patient  consumes alcohol on social occasions never in excess. The patient has previously consumed alcohol socially never in excess. The patient does not work. The patient is currently receiving disability. PHYSICAL EXAMINATION:  Visit Vitals  /85 (BP 1 Location: Left arm, BP Patient Position: Sitting)   Pulse (!) 53   Temp 96.9 °F (36.1 °C)   Ht 5' 9.5\" (1.765 m)   Wt 183 lb (83 kg)   SpO2 95%   BMI 26.64 kg/m²     General: No acute distress. Eyes: Sclera anicteric. ENT: No oral lesions. Thyroid normal.  Nodes: No adenopathy. Skin: No spider angiomata. No jaundice. No palmar erythema. Respiratory: Lungs clear to auscultation.    Cardiovascular: Regular heart rate. No murmurs. No JVD. Abdomen: Soft non-tender. Liver size normal to percussion/palpation. Spleen not palpable. No obvious ascites. Extremities: No edema. No muscle wasting. No gross arthritic changes. Neurologic: Alert and oriented. Cranial nerves grossly intact. No asterixis. LABORATORY STUDIES:  L[facility] [place] of [address] [place] [address] & Units 11/5/2019 7/22/2019   WBC 3.4 - 10.8 x10E3/uL 3.5 (L) 3.0 (L)   ANC 1.4 - 7.0 x10E3/uL 1.2 (L) 1.1 (L)   HGB 13.0 - 17.7 g/dL 14.3 14.5    - 450 x10E3/uL 185 144   INR 0.8 - 1.2    1.0   AST  51 (H) 54 (H)   ALT  49 51   Alk Phos  75 80   Bili, Total  0.4 0.6   Bili, Direct  0.2 0.2   Albumin  3.6 4.0   BUN  11 12   Creat  0.90 1.13   Na  140 140   K  3.6 4.4   Cl  106 105   CO2  28 33 (H)   Glucose  82 81     SEROLOGIES:  Serologies Latest Ref Rng & Units 7/22/2019   Hep A Ab, Total NEGATIVE   Positive (A)   Hep B Surface Ag <1.00 Index <0.10   Hep B Surface Ag Interp NEG   NEGATIVE   Hep B Core Ab, Total NEGATIVE   NEGATIVE   Hep B Surface Ab >10.0 mIU/mL 3.38 (L)   Hep B Surface Ab Interp POS   NEGATIVE (A)   Hep C Genotype  1a     LIVER HISTOLOGY:  10/2019. Elastography:   Range: 5.79-7.91 kPa  E Mean: 6.89 kPa  E Median: 6.86 kPa  E Std: 0.79 kPa    ENDOSCOPIC PROCEDURES:  Not available or performed    RADIOLOGY:  10/2019. Ultrasound of the liver. Nonspecific heterogeneous hepatic echotexture suggesting intrinsic liver disease. No evidence for discrete hepatic lesion or portal hypertension. OTHER TESTING:  Not available or performed    FOLLOW-UP:  All of the issues listed above in the Assessment and Plan were discussed with the patient. All questions were answered. The patient expressed a clear understanding of the above. [address] in 16 weeks to assess for SVR 12.       Erving Rinne, FNP-C  [address], [place] [address]., [address] [facility]   [phone]

## 2020-01-23 LAB
ALBUMIN SERPL-MCNC: 4.3 G/DL (ref 3.8–4.8)
ALP SERPL-CCNC: 68 IU/L (ref 39–117)
ALT SERPL-CCNC: 12 IU/L (ref 0–44)
AST SERPL-CCNC: 20 IU/L (ref 0–40)
BASOPHILS # BLD AUTO: 0 X10E3/UL (ref 0–0.2)
BASOPHILS NFR BLD AUTO: 1 %
BILIRUB DIRECT SERPL-MCNC: 0.14 MG/DL (ref 0–0.4)
BILIRUB SERPL-MCNC: 0.5 MG/DL (ref 0–1.2)
BUN SERPL-MCNC: 9 MG/DL (ref 8–27)
BUN/CREAT SERPL: 7 (ref 10–24)
CALCIUM SERPL-MCNC: 10.2 MG/DL (ref 8.6–10.2)
CHLORIDE SERPL-SCNC: 100 MMOL/L (ref 96–106)
CO2 SERPL-SCNC: 26 MMOL/L (ref 20–29)
CREAT SERPL-MCNC: 1.3 MG/DL (ref 0.76–1.27)
EOSINOPHIL # BLD AUTO: 0.1 X10E3/UL (ref 0–0.4)
EOSINOPHIL NFR BLD AUTO: 2 %
ERYTHROCYTE [DISTWIDTH] IN BLOOD BY AUTOMATED COUNT: 13 % (ref 11.6–15.4)
GLUCOSE SERPL-MCNC: 86 MG/DL (ref 65–99)
HCT VFR BLD AUTO: 40.2 % (ref 37.5–51)
HCV RNA SERPL NAA+PROBE-ACNC: NORMAL IU/ML
HGB BLD-MCNC: 14.2 G/DL (ref 13–17.7)
IMM GRANULOCYTES # BLD AUTO: 0 X10E3/UL (ref 0–0.1)
IMM GRANULOCYTES NFR BLD AUTO: 0 %
LYMPHOCYTES # BLD AUTO: 1.6 X10E3/UL (ref 0.7–3.1)
LYMPHOCYTES NFR BLD AUTO: 43 %
MCH RBC QN AUTO: 32.6 PG (ref 26.6–33)
MCHC RBC AUTO-ENTMCNC: 35.3 G/DL (ref 31.5–35.7)
MCV RBC AUTO: 92 FL (ref 79–97)
MONOCYTES # BLD AUTO: 0.4 X10E3/UL (ref 0.1–0.9)
MONOCYTES NFR BLD AUTO: 9 %
NEUTROPHILS # BLD AUTO: 1.7 X10E3/UL (ref 1.4–7)
NEUTROPHILS NFR BLD AUTO: 45 %
PLATELET # BLD AUTO: 203 X10E3/UL (ref 150–450)
POTASSIUM SERPL-SCNC: 3.6 MMOL/L (ref 3.5–5.2)
PROT SERPL-MCNC: 8.1 G/DL (ref 6–8.5)
RBC # BLD AUTO: 4.35 X10E6/UL (ref 4.14–5.8)
SODIUM SERPL-SCNC: 140 MMOL/L (ref 134–144)
TEST INFORMATION: NORMAL
WBC # BLD AUTO: 3.8 X10E3/UL (ref 3.4–10.8)

## 2020-05-13 ENCOUNTER — VIRTUAL VISIT (OUTPATIENT)
Dept: HEMATOLOGY | Age: 65
End: 2020-05-13

## 2020-05-13 DIAGNOSIS — B18.2 CHRONIC HEPATITIS C WITHOUT HEPATIC COMA (HCC): Primary | ICD-10-CM

## 2020-05-13 NOTE — PROGRESS NOTES
Martha Ricardo is a 77 y.o. male evaluated via audio only technology on 5/13/2020. Consent: He and/or his health care decision maker is aware that he may receive a bill for this audio only encounter, depending on his insurance coverage, and has provided verbal consent to proceed: Yes    I communicated with the patient and/or health care decision maker about the nature and details of the following:  Assessment & Plan:   All labs, imaging, and elastography reviewed. The patient was treated for chronic  hepatitis C with 12 weeks of epclusa between 11/16/2019 and 02/07/2020. He denies missing any doses of the medication. He had no treatment related complaints. I explained that he will always be HCV antibody positive, even though the virus is likely eradicated. I ordered labs today to assess for SVR 12 (cure). I asked hte patient to hold off on having these labs drawn until the stay at home order, which is secondary to the [place] 19 pandemic, is lifted and he can safely have the blood work drawn. He does not need a follow up appointment unless he did not cured of the HCV. 12  Subjective:   Martha Ricardo is a 77 y.o. male who was seen for chronic hepatitis C. Prior to Admission medications    Medication Sig Start Date End Date Taking? Authorizing Provider   carvedilol (COREG) 12.5 mg tablet Take 12.5 mg by mouth. Provider, Historical   hydroCHLOROthiazide (HYDRODIURIL) 25 mg tablet Take 25 mg by mouth. 3/16/18   Provider, Historical   lisinopril (PRINIVIL, ZESTRIL) 20 mg tablet Take 20 mg by mouth. 3/16/18   Provider, Historical   sertraline (ZOLOFT) 50 mg tablet Take 50 mg by mouth. 3/16/18   Provider, Historical   sofosbuvir-velpatasvir (EPCLUSA) 400-100 mg tablet Take 1 Tab by mouth daily.  Indications: Chronic Infection of Genotype 1 Hepatitis C Virus 11/5/19 5/13/20  Agustina Dickerson NP     Not on File    ROS   Constitution systems: Negative for fever, chills, weight gain, weight loss.   Eyes: Negative for visual changes. ENT: Negative for sore throat, painful swallowing. Respiratory: Negative for cough, hemoptysis, SOB. Cardiology: Negative for chest pain, palpitations. GI:  Negative for constipation or diarrhea. : Negative for urinary frequency, dysuria, hematuria, nocturia. Skin: Negative for rash. Hematology: Negative for easy bruising, blood clots. Musculo-skelatal: Negative for back pain, muscle pain, weakness. Neurologic: Negative for headaches, dizziness, vertigo, memory problems not related to HE. Psychology: Negative for anxiety, depression. I affirm this is a Patient-Initiated Episode with a Patient who has not had a related appointment within my department in the past 7 days or scheduled within the next 24 hours.     Total Time: minutes: 11-20 minutes    Note: not billable if this call serves to triage the patient into an appointment for the relevant concern      Rakesh Lemon NP